# Patient Record
(demographics unavailable — no encounter records)

---

## 2025-01-15 NOTE — ADDENDUM
[FreeTextEntry1] : I, Josh Sorto, acted as a scribe on behalf of Dr. Kvng Contreras MD, on 01/15/2025.   All medical entries made by the scribe were at my, Dr. Kvng Contreras MD, direction and personally dictated by me on 01/15/2025. I have reviewed the chart and agree that the record accurately reflects my personal performance of the history, physical exam, assessment and plan. I have also personally directed, reviewed, and agreed with the chart.

## 2025-01-15 NOTE — HEALTH RISK ASSESSMENT
[No] : In the past 12 months have you used drugs other than those required for medical reasons? No [0] : 2) Feeling down, depressed, or hopeless: Not at all (0) [Never] : Never [[id number]] : 0

## 2025-01-15 NOTE — ASSESSMENT
[FreeTextEntry1] : Follow up visit: Diarrhea  -BP is stable. Continue current management. - Order Flu Panel   - RTO in 3 months   Pt verbalized understanding and will reach should any questions/concerns occur.

## 2025-01-15 NOTE — REVIEW OF SYSTEMS
[Fever] : fever [Chest Pain] : chest pain [Cough] : cough [Nausea] : nausea [Diarrhea] : diarrhea [Negative] : Heme/Lymph

## 2025-01-15 NOTE — HISTORY OF PRESENT ILLNESS
[FreeTextEntry1] : Patient presents today for longitudinal care. [de-identified] : Patient presents today for longitudinal care.  Patient c/o fever, cough, diarrhea which onset on Monday. Pt reports CP which onset on Tuesday and has since ceased. Confirms slight stomachache, denies vomiting. Confirms breathing is stable. Denies testing for flu and COVID. Denies sore throat and congestion. Reports appetite being stable now, confirms he is eating normal. Confirms being achy around his knees. Pt also confirms energy levels are better now. Confirms staying hydrated.    Swab, oral consent received.

## 2025-07-03 NOTE — ASSESSMENT
[FreeTextEntry1] : 24M here with 4 mm L proximal stone diagnosed at [facility] on 7/2. Discharged on MET, asymptomatic and has not required pain medications. Does not think he has passed stone.   We discussed the risks, benefits, and alternatives for his ureteral stone management, including watchful waiting (with or without medical expulsive therapy) and surgical intervention.   Watchful waiting:  This is an acceptable initial approach for ureteral stones <10 mm if the stone can be expected to pass spontaneously within a reasonable time and the pain is tolerable without evidence of infection or renal compromise. I explained that there is a strong correlation between stone size and location and the likelihood for spontaneous stone passage. In general, 68% of stones <5 mm and 47% of stones >5 mm will pass spontaneously over a mean time of approximately 2 weeks. This rate for upper, mid, and distal ureteral stones is approximately 50%, 60% and 70%, respectively. While many advocate intervention if the stone fails to pass within 4 weeks of the start of symptoms, there is evidence of spontaneous passage rates up to 98% at 6 weeks for stones of <5 mm. Therefore, longer follow-up (up to 6 weeks) may be warranted, particularly for smaller stones.   * Medical expulsive therapy (MET) may be added to watchful waiting as it is thought to expedite stone expulsion. Traditionally, off-label alpha-blocker therapy is prescribed based on prior trials supporting their use in distal stones >5mm. However, I explained that this represents an increasingly controversial topic due to the contradictory results of more contemporary high-quality studies, the number of which outweigh those demonstrating a therapeutic benefit. At the same time, the risks of short-term alpha-blocker therapy are low and therefore this may be an option for well-informed, select patients.   Shock Wave Lithotripsy (SWL):  This is the least invasive form of surgery for stones and an excellent option for select stones. For ureteral stones, SWL is limited to the upper ureter. I explained how the procedure is performed and the concept behind shock waves. Procedural success is dependent on several stone and environmental factors such as the stone composition or density on CT, the presence or absence of hydronephrosis, size of the stone, stone location, and skin-to-stone distance on CT scan (patients body habitus). For these reasons, not all stones/patients are good candidates for SWL. The chances of being stone free after SWL are often much lower compared to other modalities, such as ureteroscopy, except in select cases where stone-free rates are comparable. Therefore, there is a risk that the patient would need subsequent procedures to render them stone-free. Since this is a non-invasive procedure, we are relying on the kidney to spontaneously pass the resultant stone fragments. At the same time, this procedure does carry some perioperative risks, mainly bleeding and infection, as well as the small risk of developing obstruction due to passage of stone fragments, which could require urgent placement of a double-J ureteral stent or nephrostomy tube.    Ureteroscopy:  I explained the technique in detail and how it is performed. Though more invasive than SWL, high stone free rates (approaching 90% for ureteral stones; 60% for renal stones) have made it increasingly popular among physicians and patients. Very commonly, a ureteral stent is left in place at the conclusion of the procedure, but only if needed. I explained that if a stent is placed, it would need to be removed either cystoscopically under local anesthesia or it may have a string left externally through the urethra for removal in a couple of days after the procedure. Risks of ureteroscopy include, but are not limited to, bleeding, infection, injury to the bladder or ureter, ureteral perforation, ureteral stricture, and other risks involved with general anesthesia. There is also the risk that the procedure needs to be staged into more than one session based on the patient's internal anatomy and the size of the stone(s). Finally, dilation of the ureter and/or ureteral stent placement prior to definitive ureteroscopy may be necessary to achieve ureteral access safely.    I explained all these options to the patient and my assessment of the risks and benefits of each approach.   I have answered all the patients questions and he has elected to undergo a period of watchful waiting to attempt spontaneous stone passage. Strict precautions to contact our team were given for intractable pain, inability to tolerate oral intake, fevers/chills concerning for infection, or any other concerns.   Return in 3-4 weeks w/ RBUS Continue flomax

## 2025-07-03 NOTE — HISTORY OF PRESENT ILLNESS
[FreeTextEntry1] : 24M here as a new patient for kidney stones. He presented to [facility] 2 days ago on July 1st with bladder pain and hematuria. CT showed 4 mm obstructing stone in the proximal left ureter with mild hydronephrosis. Was prescribed flomax and discharged with MET. Has not needed any OTC pain medications, No pain since ED visit.  No nausea or vomiting, fevers, chills, dysuria. Stays hydrated, drinks 3-4 bottles of water daily. Never had stones before.   No previous hx of kidney stones Mhx: None Shx: None Social hx: [employer] worker, non-smoker Family hx: No family  malignancies

## 2025-07-03 NOTE — PHYSICAL EXAM
[General Appearance - Well Developed] : well developed [General Appearance - Well Nourished] : well nourished [Normal Appearance] : normal appearance [Heart Rate And Rhythm] : heart rate and rhythm were normal [] : no respiratory distress [Abdomen Soft] : soft [Abdomen Tenderness] : non-tender [Costovertebral Angle Tenderness] : no ~M costovertebral angle tenderness [Normal Station and Gait] : the gait and station were normal for the patient's age

## 2025-07-08 NOTE — HISTORY OF PRESENT ILLNESS
[FreeTextEntry1] : 24-year-old male presents as follow-up.  To review, he was seen in the emergency department at [facility] approximately 1 week ago with left-sided flank pain, and a CT scan demonstrating a left 4 mm proximal stone.  He has had no pain since. Denies fever, chills, nausea, emesis

## 2025-07-08 NOTE — ASSESSMENT
[FreeTextEntry1] : 24-year-old male with a left ureteral stone.  He is asymptomatic. - Renal ultrasound today without hydronephrosis, stones. B/l jets seen - DID discuss limitations of US in this setting and the small chance of missing a ureteral stone that is nonobstructing.  Discussed continued observation versus CT scan for confirmation.  After discussion with patient and family, plan for CT renal stone protocol to evaluate for presence of ureteral stone

## 2025-07-18 NOTE — HISTORY OF PRESENT ILLNESS
[FreeTextEntry1] : This telephonic visit was provided via audio only technology. The patient, was located at [facility] at the time of the visit.  The provider, Carroll Bee, was located at [place], [place] at the time of the visit. The patient and Provider participated in the telephonic visit.  Verbal consent for telephonic services was given on 7/18/2025 by the patient.   The patient-doctor relationship has been established in a face to face fashion via real time video/audio HIPAA compliant communication using telemedicine software. The patient's identity has been confirmed. The patient was previously emailed a copy of the telemedicine consent. They have had a chance to review and has now given verbal consent and has requested care to be assessed and treated via telemedicine. They understand there may be limitations in this process, and that they may need further follow-up care in the office and/or hospital settings.  24-year-old male presents as follow-up.  To review, he was seen in the emergency department at [facility] approximately July 1 with left-sided flank pain, and a CT scan demonstrating a left 4 mm proximal stone.  He has had no pain since. Follow-up ultrasound in office demonstrating resolved hydronephrosis, however, follow-up CT demonstrating persistence of ureteral stone Denies fever, chills, nausea, emesis

## 2025-07-18 NOTE — HISTORY OF PRESENT ILLNESS
[FreeTextEntry1] : This telephonic visit was provided via audio only technology. The patient, was located at [facility] at the time of the visit.  The provider, Carroll Bee, was located at [place], [place] at the time of the visit. The patient and Provider participated in the telephonic visit.  Verbal consent for telephonic services was given on 7/18/2025 by the patient.   The patient-doctor relationship has been established in a face to face fashion via real time video/audio HIPAA compliant communication using telemedicine software. The patient's identity has been confirmed. The patient was previously emailed a copy of the telemedicine consent. They have had a chance to review and has now given verbal consent and has requested care to be assessed and treated via telemedicine. They understand there may be limitations in this process, and that they may need further follow-up care in the office and/or hospital settings.  24-year-old male presents as follow-up.  To review, he was seen in the emergency department at [facility] approximately July 1 with left-sided flank pain, and a CT scan demonstrating a left 4 mm proximal stone.  He has had no pain since. Follow-up ultrasound in office demonstrating resolved hydronephrosis, however, follow-up CT demonstrating persistence of ureteral stone Denies fever, chills, nausea, emesis

## 2025-07-18 NOTE — ASSESSMENT
[FreeTextEntry1] : 24-year-old male with a left ureteral stone.  He is asymptomatic. - CT showing persistence of ureteral stone - Patient's mother is leaving country in mid-august - patient and mother not comfortable with idea of patient being alone if has acute pain again from ureteral stone. Discussed high likelihood of spontaneous stone passage, however, both patient and family are most interested in moving forward with definitive surgery. Discussed possibility of NEGATIVE ureteroscopy if patient passes stone between now and surgery  - Schedule LEFT ureteroscopy, laser lithotripsy, stent placement   Ureteroscopy:  I explained the technique in detail and how it is performed. Though more invasive than SWL, stone-free rates are higher with this procedure for many stones, including those in the distal ureter and larger stones in the kidney and other segments of the ureter. Unlike SWL, its success rate is far less dependent on stone hardness, an unknown in many patients until the time of surgery. With regards to surgical outcomes, true (100%) stone-free rates approach 90% for ureteral stones and are more conservative (~60%) for renal stones - importantly, these rates are directly correlated to the total stone size/burden. Very commonly, a ureteral stent is left in place at the conclusion of the procedure, but only if needed. I explained that up to 80% of patients have some degree of stent-related symptoms, including flank pain, urinary urgency, pain with urination, blood in the urine, or general discomfort. If a stent is placed, it would need to be removed either cystoscopically under local anesthesia or it may have a string left externally through the urethra for removal in the office approximately 1 week after the procedure. Risks of ureteroscopy include, but are not limited to, infection (5%), bleeding, injury to the bladder or ureter, ureteral perforation, ureteral stricture, and other risks involved with general anesthesia. There is also the risk that the procedure needs to be staged into more than one session based on the patient's internal anatomy and the size of the stone(s). Finally, dilation of the ureter and/or ureteral stent placement prior to definitive ureteroscopy may be necessary to achieve ureteral access safely.  Telehealth Consultation: 20 minutes 10 minutes reviewing his history and discussing prior results. 10 minutes discussing various treatment options and writing his note.